# Patient Record
Sex: FEMALE | Race: WHITE | NOT HISPANIC OR LATINO | Employment: UNEMPLOYED | URBAN - METROPOLITAN AREA
[De-identification: names, ages, dates, MRNs, and addresses within clinical notes are randomized per-mention and may not be internally consistent; named-entity substitution may affect disease eponyms.]

---

## 2017-10-23 ENCOUNTER — OFFICE VISIT (OUTPATIENT)
Dept: PODIATRY | Facility: CLINIC | Age: 42
End: 2017-10-23
Payer: COMMERCIAL

## 2017-10-23 VITALS — HEIGHT: 69 IN | BODY MASS INDEX: 24.14 KG/M2 | WEIGHT: 163 LBS

## 2017-10-23 DIAGNOSIS — L60.0 INGROWN NAIL: ICD-10-CM

## 2017-10-23 DIAGNOSIS — L03.032 PARONYCHIA OF TOE OF LEFT FOOT: ICD-10-CM

## 2017-10-23 DIAGNOSIS — M79.675 PAIN OF TOE OF LEFT FOOT: Primary | ICD-10-CM

## 2017-10-23 PROCEDURE — 99999 PR PBB SHADOW E&M-EST. PATIENT-LVL II: CPT | Mod: PBBFAC,,, | Performed by: PODIATRIST

## 2017-10-23 PROCEDURE — 99204 OFFICE O/P NEW MOD 45 MIN: CPT | Mod: S$GLB,,, | Performed by: PODIATRIST

## 2017-10-23 RX ORDER — CLINDAMYCIN HYDROCHLORIDE 300 MG/1
300 CAPSULE ORAL EVERY 8 HOURS
Qty: 21 CAPSULE | Refills: 0 | Status: SHIPPED | OUTPATIENT
Start: 2017-10-23

## 2017-10-23 NOTE — PROGRESS NOTES
Subjective:      Patient ID: Marija Townsend is a 42 y.o. female.    Chief Complaint: Ingrown Toenail (Left Gr toe)  Patient presents to clinic with the chief complaint of pain in the Lt. Great toe, specifically involving the medial border of the nail plate.  States the site has been swollen and tender for over several weeks.  Describes pain as aching and rates as a 3/10.  States symptoms are alleviated with rest and exacerbated with direct pressure.  She has been treating regularly with warm water and epsom salt soaks which has helped to reduce current pain symptoms.  Has noted no overt drainage from the toe.  Denies any additional pedal complaints.      History reviewed. No pertinent past medical history.    Past Surgical History:   Procedure Laterality Date    BREAST SURGERY         History reviewed. No pertinent family history.    Social History     Social History    Marital status:      Spouse name: N/A    Number of children: N/A    Years of education: N/A     Social History Main Topics    Smoking status: None    Smokeless tobacco: None    Alcohol use None    Drug use: Unknown    Sexual activity: Not Asked     Other Topics Concern    None     Social History Narrative    None       Current Outpatient Prescriptions   Medication Sig Dispense Refill    valacyclovir (VALTREX) 1000 MG tablet Take 1 tablet (1,000 mg total) by mouth 2 (two) times daily. 10 tablet 11     No current facility-administered medications for this visit.        Review of patient's allergies indicates:  No Known Allergies    Review of Systems   Constitution: Negative for chills and fever.   Cardiovascular: Negative for claudication and leg swelling.   Skin: Positive for color change and nail changes.   Musculoskeletal: Negative for joint pain and joint swelling.   Neurological: Negative for numbness and paresthesias.   Psychiatric/Behavioral: Negative for altered mental status.           Objective:      Physical Exam    Constitutional: She is oriented to person, place, and time. She appears well-developed and well-nourished. No distress.   Cardiovascular:   Pulses:       Dorsalis pedis pulses are 2+ on the right side, and 2+ on the left side.        Posterior tibial pulses are 2+ on the right side, and 2+ on the left side.   CFT <3 seconds bilateral.  Pedal hair growth present bilateral.   No varicosities noted bilateral.  No bilateral lower extremity edema.   Musculoskeletal: She exhibits tenderness. She exhibits no edema.   Muscle strength 5/5 in all muscle groups bilateral.  No tenderness nor crepitation with ROM of foot/ankle joints bilateral.  Pain with palpation to the distal tip of the Lt. Hallux medial nail fold.     Neurological: She is alert and oriented to person, place, and time. She has normal strength.   Light touch intact bilateral.     Skin: Skin is warm, dry and intact. Capillary refill takes less than 2 seconds. No abrasion, no bruising, no burn, no ecchymosis, no laceration, no lesion, no petechiae and no rash noted. She is not diaphoretic. There is erythema. No pallor.   Pedal skin has normal turgor, temperature, and texture bilateral.  Incurvation noted to the medial border of the Lt. Hallux nail plate with mild erythema and localized edema of the adjacent nail fold.  Remaining toenails x 9 appear normotrophic. Examination of the skin reveals no evidence of significant maceration, rashes, open lesions, suspicious appearing nevi or other concerning lesions.              Assessment:       Encounter Diagnoses   Name Primary?    Pain of toe of left foot Yes    Ingrown nail     Paronychia of toe of left foot          Plan:       Marija was seen today for ingrown toenail.    Diagnoses and all orders for this visit:    Pain of toe of left foot    Ingrown nail    Paronychia of toe of left foot    Other orders  -     Cancel: Aerobic culture (Specify Source)  -     Cancel: CULTURE, ANAEROBE      I counseled the  patient on her conditions, their implications and medical management.    Discussed with patient treatment options regarding onychocryptosis of the Lt. Hallux medial nail fold.    Patient opts to treat daily by soaking the affected toe in epsom salt and warm water for at least 15 minutes daily for 1 week.    Advised to apply neosporin and cover the site with a bandage daily x 1 week.    Prescription written for clindamycin to address paronychia.    RTC in 1 week should symptoms fail to improve or worsen for partial nail avulsion.    Rony Acosta DPM
